# Patient Record
Sex: FEMALE | Race: OTHER | URBAN - METROPOLITAN AREA
[De-identification: names, ages, dates, MRNs, and addresses within clinical notes are randomized per-mention and may not be internally consistent; named-entity substitution may affect disease eponyms.]

---

## 2017-06-26 ENCOUNTER — OFFICE VISIT (OUTPATIENT)
Dept: FAMILY MEDICINE CLINIC | Age: 67
End: 2017-06-26

## 2017-06-26 VITALS
HEIGHT: 57 IN | HEART RATE: 68 BPM | SYSTOLIC BLOOD PRESSURE: 140 MMHG | WEIGHT: 127 LBS | BODY MASS INDEX: 27.4 KG/M2 | TEMPERATURE: 97.9 F | DIASTOLIC BLOOD PRESSURE: 80 MMHG

## 2017-06-26 DIAGNOSIS — D17.1 LIPOMA OF TORSO: ICD-10-CM

## 2017-06-26 DIAGNOSIS — M25.50 ARTHRALGIA, UNSPECIFIED JOINT: Primary | ICD-10-CM

## 2017-06-26 RX ORDER — ACETAMINOPHEN 325 MG/1
650 TABLET ORAL
Qty: 60 TAB | Refills: 0
Start: 2017-06-26

## 2017-06-26 NOTE — PATIENT INSTRUCTIONS
Take over the counter Tylenol (acetaminophen) 325mg, 2 po bid prn. Dolor articular: Instrucciones de cuidado - [ Joint Pain: Care Instructions ]  Instrucciones de cuidado  Muchas personas tienen dashawn y molestias leves causados por jonelle lesión o por el uso excesivo de los músculos y las articulaciones. Las lesiones articulares (en las articulaciones) suelen producirse keila la práctica de deportes, las actividades recreativas o la realización de tareas en el trabajo o el hogar. Pasty Silvius por uso excesivo puede producirse cuando fuerza demasiado jonelle articulación o cuando hace jonelle actividad que exige un esfuerzo continuo de la articulación, amber usar la computadora o remar. Puede yrn medidas en el hogar para ayudar a mejorar bouchra músculos y articulaciones. Debería sentirse mejor dentro de 1 a 2 semanas, jose carlos la recuperación completa puede llevarle 3 meses o más. La atención de seguimiento es jonelle parte clave de jurado tratamiento y seguridad. Asegúrese de hacer y acudir a todas las citas, y llame a jurado médico si está teniendo problemas. También es jonelle buena idea saber los resultados de los exámenes y mantener jonelle lista de los medicamentos que mey. ¿Cómo puede cuidarse en el hogar? · No ponga peso en la articulación lesionada keila al menos warren o [place]. · Keila warren o dos días después de jonelle lesión, no se dé duchas ni ada calientes, y no utilice compresas calientes. El calor podría empeorar la hinchazón. · Colóquese hielo o jonelle compresa fría en la articulación adolorida por entre 10 y 21 minutos cada vez. Trate de hacerlo cada 1 o 2 horas keila los siguientes 3 días (cuando esté despierto) o hasta que le baje la hinchazón. Póngase un paño agustin entre el hielo y la piel. · Colóquese un vendaje elástico alrededor de la lesión. No lo ajuste demasiado porque podría aumentar la hinchazón.   · Eleve la articulación dolorida sobre jonelle almohada mientras se aplica hielo, o en cualquier momento que se siente o se acueste keila los 3 días siguientes. Trate de mantenerla por encima del nivel del corazón. [place] ayudará a reducir la hinchazón. · [place] un analgésico (medicamento para el dolor) de venta andreas, amber acetaminofén (Tylenol), ibuprofeno (Advil, Motrin) o naproxeno (Aleve). Lynette y siga todas las instrucciones de la [place]. · Después de 1 o 2 días de reposo, comience a  la articulación lentamente. Si brian la articulación todavía se está recuperando, puede comenzar a ejercitarla con actividades que no impliquen un esfuerzo ni dañen la articulación dolorida. ¿Cuándo debe pedir ayuda? Llame a jurado médico ahora mismo o busque atención médica inmediata si:  · Tiene señales de infección, tales amber:  ¨ Mayor dolor, hinchazón, enrojecimiento y aumento de la temperatura en la anna. ¨ Vetas rojizas que comienzan en la articulación. Cleven Frandy. Preste especial atención a los cambios en jurado ritu y asegúrese de comunicarse con jurado médico si:  · Jurado capacidad de movimiento o bouchra síntomas no mejoran después de 1 o 2 semanas de tratamiento en el hogar. ¿Dónde puede encontrar más información en inglés? Abhinav Santamaria a [email or web address] [place] [address]205 en la búsqueda para aprender más acerca de \"Dolor articular: Instrucciones de cuidado - [ Joint Pain: Care Instructions ]. \"  Revisado: 21 marzo, 2017  Versión del contenido: 11.3  © 7583-4921 [employer]. Las instrucciones de cuidado fueron adaptadas bajo licencia por [employer] (which disclaims liability or warranty for this information). Si usted tiene [place] afección médica o sobre estas instrucciones, siempre pregunte a jurado profesional de ritu. [facility] niega toda garantía o responsabilidad por jurado uso de esta información.

## 2017-06-26 NOTE — PROGRESS NOTES
Printed AVS, provided to pt and reviewed. Pt indicated understanding and had no questions. Reviewed Tylenol with the pt.   Zhang Olvera RN

## 2017-06-26 NOTE — PROGRESS NOTES
Assessment/Plan:       ICD-10-CM ICD-9-CM    1. Arthralgia, unspecified joint M25.50 719.40 acetaminophen (TYLENOL) 325 mg tablet   2. Lipoma of torso D17.1 214.1        [facility]  Subjective:     Chief Complaint   Patient presents with    Generalized Body Aches     pt c/o pain in joints in hands, feet, and knees for the last 2 years    Mass     pt c/o mass in upper back, on and off pain, no discharge per pt. Melissa De Jesus is a 78 y.o. OTHER female. HPI: Marisela Boles, daughter of the patient. Hurts elbows, knees and has lump on back. Hurts above her ankles bilaterally. Sometimes numbness of the hands. Nothing taken for the pain. Not even something natural.  When it is more cold she has more pain. No history of injury. When knees are bent it bothers her more. She  has no past medical history on file. She  does not have a problem list on file. Review of Systems: Negative for: fever, chest pain, shortness of breath, leg swelling, exertional dyspnea, palpitations. Current Medications:   No current outpatient prescriptions on file prior to visit. No current facility-administered medications on file prior to visit. Past Surgical History: She  has no past surgical history on file. Social and Family History: She  reports that she has never smoked. She has never used smokeless tobacco. She reports that she does not drink alcohol or use illicit drugs. ; family history is not on file. Objective:     Vitals:    06/26/17 1148 06/26/17 1154 06/26/17 1407   BP: 165/76 165/76 140/80   Pulse: 68 68    Temp: 97.9 °F (36.6 °C)     Weight: 127 lb (57.6 kg)     Height: 4' 8.69\" (1.44 m)      No LMP recorded. Patient is postmenopausal.   Wt Readings from Last 2 Encounters:   06/26/17 127 lb (57.6 kg)     No results found for any visits on 06/26/17. Physical Examination: 3cm x 3cm x 1cm uniformly raised, nontender, mobile, noninfected cyst vs lipoma of the center of the upper back.  Creptus of the knees with full extension; FROM arms, knees. General appearance - well developed, no acute distress. Chest - clear to auscultation. Heart - regular rate and rhythm without murmurs, rubs, or gallops. Abdomen - bowel sounds present x 4, NT, ND. Extremities - pulses intact. Mild edema at ankles, no pitting. Assessment/Plan:   Manuel Ansari was seen today for generalized body aches and mass. Diagnoses and all orders for this visit:    Arthralgia, unspecified joint  -     acetaminophen (TYLENOL) 325 mg tablet; Take 2 Tabs by mouth two (2) times daily as needed for Pain. Beninese sig    Lipoma of torso      Follow-up Disposition:  Return if symptoms worsen or fail to improve. Lipoma  Please print AVS, review. OTC Tylenol 325mg, 2 po bid prn pain. Handout on joint pain. Follow up 1 month if not improving. BP was high but OK on recheck. Maureen Chew, DNP, FNP-BC, BC-ADM  Dat Dia expressed understanding of this plan.